# Patient Record
Sex: MALE | Race: WHITE | NOT HISPANIC OR LATINO | Employment: FULL TIME
[De-identification: names, ages, dates, MRNs, and addresses within clinical notes are randomized per-mention and may not be internally consistent; named-entity substitution may affect disease eponyms.]

---

## 2019-09-05 ENCOUNTER — RECORDS - HEALTHEAST (OUTPATIENT)
Dept: ADMINISTRATIVE | Facility: OTHER | Age: 24
End: 2019-09-05

## 2022-11-12 ENCOUNTER — HOSPITAL ENCOUNTER (EMERGENCY)
Facility: CLINIC | Age: 27
Discharge: HOME | End: 2022-11-12
Attending: PHYSICIAN ASSISTANT | Admitting: PHYSICIAN ASSISTANT
Payer: COMMERCIAL

## 2022-11-12 ENCOUNTER — TRANSFERRED RECORDS (OUTPATIENT)
Dept: HEALTH INFORMATION MANAGEMENT | Facility: CLINIC | Age: 27
End: 2022-11-12

## 2022-11-12 ENCOUNTER — APPOINTMENT (OUTPATIENT)
Dept: GENERAL RADIOLOGY | Facility: CLINIC | Age: 27
End: 2022-11-12
Attending: PHYSICIAN ASSISTANT
Payer: COMMERCIAL

## 2022-11-12 VITALS
DIASTOLIC BLOOD PRESSURE: 91 MMHG | OXYGEN SATURATION: 100 % | WEIGHT: 190 LBS | TEMPERATURE: 97.5 F | HEIGHT: 69 IN | RESPIRATION RATE: 16 BRPM | HEART RATE: 70 BPM | BODY MASS INDEX: 28.14 KG/M2 | SYSTOLIC BLOOD PRESSURE: 131 MMHG

## 2022-11-12 DIAGNOSIS — S62.92XB FRACTURE OF LEFT HAND, OPEN, INITIAL ENCOUNTER: ICD-10-CM

## 2022-11-12 PROCEDURE — 99202 OFFICE O/P NEW SF 15 MIN: CPT | Mod: 25 | Performed by: PHYSICIAN ASSISTANT

## 2022-11-12 PROCEDURE — 12001 RPR S/N/AX/GEN/TRNK 2.5CM/<: CPT | Performed by: PHYSICIAN ASSISTANT

## 2022-11-12 PROCEDURE — 73140 X-RAY EXAM OF FINGER(S): CPT | Mod: LT

## 2022-11-12 PROCEDURE — G0463 HOSPITAL OUTPT CLINIC VISIT: HCPCS | Mod: 25 | Performed by: PHYSICIAN ASSISTANT

## 2022-11-12 RX ORDER — CEPHALEXIN 500 MG/1
500 CAPSULE ORAL 2 TIMES DAILY
Qty: 40 CAPSULE | Refills: 0 | Status: SHIPPED | OUTPATIENT
Start: 2022-11-12

## 2022-11-12 NOTE — ED PROVIDER NOTES
"  History     Chief Complaint   Patient presents with     Laceration     HPI  Jovany Ross is a 27 year old right-hand-dominant male who presents to the urgent care with concern over laceration to his left thumb which occurred just prior to arrival.  Patient reports that he was attempting to cut a piece of sheet metal with a knife when it slipped resulting in laceration to the dorsal surface of his left thumb.  He complains of moderate pain in the area.  No distal numbness or paresthesias.  No concern for foreign body in the wound.  He does state concern that he cut through the nailbed.  His tetanus vaccine is up to date.      Allergies:  No Known Allergies    Problem List:    Patient Active Problem List    Diagnosis Date Noted     Chest Pain      Priority: Medium     Created by Conversion          Past Medical History:    No past medical history on file.    Past Surgical History:    No past surgical history on file.    Family History:    No family history on file.    Social History:  Marital Status:  Single [1]        Medications:    No current outpatient medications on file.    Review of Systems  INTEGUMENTARY/SKIN: POSITIVE for laceration left thumb   MUSCULOSKELETAL: POSITIVE for left NEGATIVE for other significant arthralgias or myalgia  NEURO: NEGATIVE for numbness, paresthesia  Physical Exam   BP: (!) 131/91  Pulse: 70  Temp: 97.5  F (36.4  C)  Resp: 16  Height: 175.3 cm (5' 9\")  Weight: 86.2 kg (190 lb)  SpO2: 100 %  Physical Exam  Constitutional:       General: He is not in acute distress.     Appearance: He is not ill-appearing or toxic-appearing.   HENT:      Head: Normocephalic and atraumatic.   Musculoskeletal:      Left hand: Laceration, tenderness and bony tenderness present. No swelling or deformity. Normal range of motion. Normal strength. Normal sensation. There is no disruption of two-point discrimination.        Hands:       Comments: 2.5 cm cutaneous laceration to the dorsal surface of the " distal left thumb which does cross the nailbed   Skin:     Findings: Laceration present. No abrasion, ecchymosis, erythema or rash.   Neurological:      Mental Status: He is alert.      Sensory: No sensory deficit.       ED Course           [facility]    -Laceration Repair  Performed by: Molly Arnett PA-C  Authorized by: Molly Arnett PA-C     Risks, benefits and alternatives discussed.      ANESTHESIA (see MAR for exact dosages):     Anesthesia method:  Nerve block    Block anesthetic:  Lidocaine 1% w/o epi  LACERATION DETAILS     Location: left thumb.    Length (cm):  2.5    REPAIR TYPE:     Repair type:  Simple      EXPLORATION:     Hemostasis achieved with:  Direct pressure    Wound extent: underlying fracture      TREATMENT:     Area cleansed with:  Hibiclens    Amount of cleaning:  Standard    SKIN REPAIR     Repair method:  Sutures    Suture size:  5-0    Suture material:  Nylon    Suture technique:  Simple interrupted    Number of sutures:  7    APPROXIMATION     Approximation:  Close    POST-PROCEDURE DETAILS     Dressing:  Antibiotic ointment and tube gauze        PROCEDURE    Patient Tolerance:  Patient tolerated the procedure well with no immediate complications         Critical Care time:  none        Results for orders placed or performed during the hospital encounter of 11/12/22   Fingers XR, 2-3 views, left     Status: None    Narrative    EXAM: XR FINGER LEFT G/E 2 VIEWS  LOCATION: [facility]  DATE/TIME: 11/12/2022 5:58 PM    INDICATION: laceration, rule out bony abnormality FB  COMPARISON: None.      Impression    IMPRESSION: No definite radiopaque foreign body is identified. Skin laceration and/or minimal subcutaneous emphysema adjacent to the first proximal phalangeal base. There is a 7 mm linear ossicle along the first distal phalangeal tuft, favored to be   chronic. There is normal joint spacing and alignment.       Medications - No  data to display    7 sutures, patient declined nail removal     Assessments & Plan (with Medical Decision Making)     I have reviewed the nursing notes.  I have reviewed the findings, diagnosis, plan and need for follow up with the patient.       Discharge Medication List as of 11/12/2022  6:42 PM      START taking these medications    Details   cephALEXin (KEFLEX) 500 MG capsule Take 1 capsule (500 mg) by mouth 2 times daily, Disp-40 capsule, R-0, [facility]           Final diagnoses:   Fracture of left hand, open, initial encounter     27-year-old male presents to the urgent care with concern over laceration to his left thumb which occurred from a knife just prior to arrival.  Physical exam findings were significant for 2.5 cm subcutaneous laceration to the dorsal surface of the distal left thumb which does cross the nailbed.  He had x-ray which was read as no radiopaque foreign body.  There was a 7 mm linear ossicle along the first distal phalangeal tuft favored to be chronic however does correspond with patient's laceration today I suspect this represents acute fracture.  After discussing her/benefits patient agreed to proceed with primary closure of the skin portion of his wound.  I did discuss her/benefits of removing the nail to repair the nailbed as well however patient refused this.  He tolerated placement of 7 5-0 nylon sutures.  He was discharged home stable with prescription for keflex instructions for suture removal in 7-10 days. Suture care instructions, signs of infection, worrisome reasons to return to ER/UC sooner discussed.     Disclaimer: This note consists of symbols derived from keyboarding, dictation, and/or voice recognition software. As a result, there may be errors in the script that have gone undetected.  Please consider this when interpreting information found in the chart.      11/12/2022   [facility] EMERGENCY DEPT     Molly Arnett PA-C  11/16/22 [id number]